# Patient Record
Sex: MALE | URBAN - METROPOLITAN AREA
[De-identification: names, ages, dates, MRNs, and addresses within clinical notes are randomized per-mention and may not be internally consistent; named-entity substitution may affect disease eponyms.]

---

## 2020-04-30 ENCOUNTER — HOSPITAL ENCOUNTER (EMERGENCY)
Facility: MEDICAL CENTER | Age: 40
End: 2020-04-30
Attending: EMERGENCY MEDICINE

## 2020-04-30 VITALS
SYSTOLIC BLOOD PRESSURE: 119 MMHG | TEMPERATURE: 98.4 F | HEIGHT: 72 IN | WEIGHT: 185 LBS | BODY MASS INDEX: 25.06 KG/M2 | HEART RATE: 106 BPM | OXYGEN SATURATION: 92 % | DIASTOLIC BLOOD PRESSURE: 77 MMHG | RESPIRATION RATE: 18 BRPM

## 2020-04-30 DIAGNOSIS — F10.920 ACUTE ALCOHOLIC INTOXICATION WITHOUT COMPLICATION (HCC): ICD-10-CM

## 2020-04-30 LAB — POC BREATHALIZER: 0.48 PERCENT (ref 0–0.01)

## 2020-04-30 PROCEDURE — 302970 POC BREATHALIZER: Performed by: EMERGENCY MEDICINE

## 2020-04-30 PROCEDURE — 99284 EMERGENCY DEPT VISIT MOD MDM: CPT

## 2020-04-30 NOTE — ED PROVIDER NOTES
ED Provider Note    CHIEF COMPLAINT  Chief Complaint   Patient presents with   • ETOH Intoxication       HPI  Chadwick Puente is a 40 y.o. male with a history of chronic alcohol abuse who presents by EMS from the [facility] after being found intoxicated and unable to stand.  The patient was reported by nursing to have just been here Monday evening 2 days ago with acute alcohol intoxications.  The patient otherwise has no complaints.  He denies any fever, chills, sore throat, cough, congestion, vomiting, or diarrhea.  The patient appears quite intoxicated, and when asked if he needed anything, he asked for Ativan.      REVIEW OF SYSTEMS  See HPI for further details. All other systems are negative.     PAST MEDICAL HISTORY  Past Medical History:   Diagnosis Date   • ETOH abuse        FAMILY HISTORY  History reviewed. No pertinent family history.    SOCIAL HISTORY  Social History     Tobacco Use   • Smoking status: Not on file   Substance Use Topics   • Alcohol use: Not on file   • Drug use: Not on file      Social History     Substance and Sexual Activity   Drug Use Not on file       SURGICAL HISTORY  History reviewed. No pertinent surgical history.    CURRENT MEDICATIONS  Home Medications     Reviewed by Lisa Singleton R.N. (Registered Nurse) on 04/30/20 at [facility]  Med List Status: Not Addressed   Patient Edson Taking any Medications                 ALLERGIES  No Known Allergies    PHYSICAL EXAM0  VITAL SIGNS: Blood Pressure 119/77   Pulse (Abnormal) 106   Temperature 36.9 °C (98.4 °F)   Respiration 18   Height 1.829 m (6')   Weight 83.9 kg (185 lb)   Oxygen Saturation 92%   Body Mass Index 25.09 kg/m²   Constitutional: Disheveled male, sleeping, easily arousable, in no acute distress, Non-toxic appearance.   HENT: Atraumatic. Bilateral external ears normal, mucous membranes mildly dry, throat nonerythematous without exudates, nose is normal.  Eyes: PERRL, EOMI, conjunctiva moist, noninjected.  Neck: Nontender,  Normal range of motion, No nuchal rigidity, No stridor.   Lymphatic: No lymphadenopathy noted.   Cardiovascular: Regular rhythm, tachycardic, rate in the 100s, no murmurs, rubs, gallops.  Thorax & Lungs:  Good breath sounds bilaterally, no wheezes, rales, or retractions.  No chest tenderness.  Abdomen: Bowel sounds normal, Soft, nontender, nondistended, no rebound, guarding, masses.  Back: No CVA or spinal tenderness.  Extremities: Intact distal pulses, No edema, No tenderness.   Skin: Warm, Dry, No rashes.   Musculoskeletal: No joint swelling or tenderness.  Neurologic: Asleep, easily arousable becomes awake, appears intoxicated, cranial nerves II through XII appear intact, sensory appears intact light touch, and motor function shows good spontaneous movements of the upper and lower extremities, though patient cannot comply with neurologic exam.    Psychiatric: Intoxicated, affect flattened, judgment mildly impaired.        LABS  Labs Reviewed   POC BREATHALIZER - Abnormal; Notable for the following components:       Result Value    POC Breathalizer 0.479 (*)     All other components within normal limits       All labs reviewed by me.      RADIOLOGY/PROCEDURES  No orders to display       The radiologist's interpretations of all radiological studies have been reviewed by me.        COURSE & MEDICAL DECISION MAKING  Pertinent Labs & Imaging studies reviewed. (See chart for details)  The patient presents with the above complaints.  He appears to be acutely intoxicated.  He was noted to be easily arousable, was protecting his airway.  Initial breath alcohol was 0.479.  The patient was allowed to sleep, and was noted to be easily arousable.  Patient was able to ambulate to the bathroom without any difficulty.  He was given a meal which he ate without difficulty.  Prior to reassessment and possible discharge, the patient eloped from the emergency department.  Nursing noted he appeared to be ambulating without  difficulty.    FINAL IMPRESSION  1. Acute alcoholic intoxication without complication (HCC)          Electronically signed by: Wenceslao Willams M.D., 4/30/2020 12:33 PM

## 2020-04-30 NOTE — ED NOTES
Ambulated to bathroom and back with steady gait.  Speech clear.  Boxed lunch given, pt fed self 100%

## 2020-04-30 NOTE — ED TRIAGE NOTES
Unable to stand up in bank, slurred speech.  Pt reports etoh, denies any other drug use, denies pain, injury or acute medical c/o.  bgl [id number]